# Patient Record
Sex: FEMALE | Race: WHITE
[De-identification: names, ages, dates, MRNs, and addresses within clinical notes are randomized per-mention and may not be internally consistent; named-entity substitution may affect disease eponyms.]

---

## 2020-08-17 ENCOUNTER — RESULT REVIEW (OUTPATIENT)
Age: 70
End: 2020-08-17

## 2021-08-23 ENCOUNTER — RESULT REVIEW (OUTPATIENT)
Age: 71
End: 2021-08-23

## 2021-10-21 PROBLEM — Z00.00 ENCOUNTER FOR PREVENTIVE HEALTH EXAMINATION: Status: ACTIVE | Noted: 2021-10-21

## 2021-12-22 ENCOUNTER — APPOINTMENT (OUTPATIENT)
Dept: UROLOGY | Facility: CLINIC | Age: 71
End: 2021-12-22
Payer: MEDICARE

## 2021-12-22 VITALS
BODY MASS INDEX: 26.87 KG/M2 | TEMPERATURE: 97.5 F | RESPIRATION RATE: 16 BRPM | WEIGHT: 146 LBS | HEIGHT: 62 IN | HEART RATE: 74 BPM | SYSTOLIC BLOOD PRESSURE: 143 MMHG | DIASTOLIC BLOOD PRESSURE: 84 MMHG

## 2021-12-22 DIAGNOSIS — Z86.2 PERSONAL HISTORY OF DISEASES OF THE BLOOD AND BLOOD-FORMING ORGANS AND CERTAIN DISORDERS INVOLVING THE IMMUNE MECHANISM: ICD-10-CM

## 2021-12-22 DIAGNOSIS — Z80.6 FAMILY HISTORY OF LEUKEMIA: ICD-10-CM

## 2021-12-22 DIAGNOSIS — Z78.9 OTHER SPECIFIED HEALTH STATUS: ICD-10-CM

## 2021-12-22 DIAGNOSIS — N20.0 CALCULUS OF KIDNEY: ICD-10-CM

## 2021-12-22 DIAGNOSIS — H04.129 DRY EYE SYNDROME OF UNSPECIFIED LACRIMAL GLAND: ICD-10-CM

## 2021-12-22 PROCEDURE — 99203 OFFICE O/P NEW LOW 30 MIN: CPT

## 2021-12-22 PROCEDURE — 51798 US URINE CAPACITY MEASURE: CPT

## 2021-12-22 RX ORDER — ESTRADIOL 0.1 MG/G
0.1 CREAM VAGINAL
Refills: 0 | Status: ACTIVE | COMMUNITY

## 2021-12-22 RX ORDER — WHEAT DEXTRIN 3 G/4 G
POWDER (GRAM) ORAL
Refills: 0 | Status: ACTIVE | COMMUNITY

## 2021-12-22 RX ORDER — SIMVASTATIN 20 MG/1
20 TABLET, FILM COATED ORAL
Refills: 0 | Status: ACTIVE | COMMUNITY

## 2021-12-22 RX ORDER — CYCLOSPORINE 0.5 MG/ML
0.05 EMULSION OPHTHALMIC
Refills: 0 | Status: ACTIVE | COMMUNITY

## 2021-12-22 RX ORDER — LEVOTHYROXINE SODIUM 150 UG/1
150 TABLET ORAL
Refills: 0 | Status: ACTIVE | COMMUNITY

## 2021-12-22 NOTE — ASSESSMENT
[FreeTextEntry1] : 71 year old nulligravida woman with a year history of urinary  frequency and urgency.She has been on oxybutynin XR 20 mg for the past one year and reports improvement with her urinary bother and has some mild side effects of dry mouth\par \par \par Life style behavioral modifications reviewed - Fluids management - Drink 48 -64  oz of water as long as no medical contraindications, refrain from drinking fluids 3 hours before  bedtime. Address constipation  with fiber intake - fruits and vegetable servings 3-5 per day, use of daily Gummy fibers supplements if needed .\par UA and cx send \par \par Renal US - script [facility]\par  \par Stop Oxybutynin ER 20 MG DAILY.Start trial of Trospium Chloride 20  mg po BID \par \par RTO in 4 months.Pt is planning to leave for [facility] in 2 weeks and back in March END 2022.\par

## 2021-12-22 NOTE — PHYSICAL EXAM
[General Appearance - Well Developed] : well developed [General Appearance - Well Nourished] : well nourished [Normal Appearance] : normal appearance [Well Groomed] : well groomed [General Appearance - In No Acute Distress] : no acute distress [Edema] : no peripheral edema [Respiration, Rhythm And Depth] : normal respiratory rhythm and effort [Exaggerated Use Of Accessory Muscles For Inspiration] : no accessory muscle use [Abdomen Soft] : soft [Abdomen Tenderness] : non-tender [Costovertebral Angle Tenderness] : no ~M costovertebral angle tenderness [Urinary Bladder Findings] : the bladder was normal on palpation [FreeTextEntry1] :  deferred  [Normal Station and Gait] : the gait and station were normal for the patient's age [] : no rash [Oriented To Time, Place, And Person] : oriented to person, place, and time [Affect] : the affect was normal [Mood] : the mood was normal [Not Anxious] : not anxious

## 2021-12-22 NOTE — HISTORY OF PRESENT ILLNESS
[FreeTextEntry1] : 71 year old nulligravida woman with a year history of urinary  frequency and urgency.She has been on oxybutynin XR 20 mg for the past one year and reports improvement with her urinary bother and has some mild side effects of dry mouth. she has been using Estrace  vaginal cream 1 or 2 times per week and feels it has also helped .fluids:  she drinks 1 1/2 litre  of water sometimes seltzer  and One Cup of coffee. Denies Constipation. Voids every two to three hours during the day and night time may wake up X 1 if she drank close to bedtime. She does report an occasional weak stream ,incomplete bladder emptying and mild mixed unary incontinence urge predominant requiring one liner per day.\par PVR - 146  ml\par UA and cx send \par Renal US - [facility] \par Stop Oxybutynin ER 20 MG DAILY.Start trial of Trospium Chloride 20  mg po BID \par RTO in 4 months.Pt is planning to leave for [facility] in 2 weeks and back in March END 2022.\par

## 2021-12-27 LAB
APPEARANCE: CLEAR
BACTERIA UR CULT: NORMAL
BACTERIA: NEGATIVE
BILIRUBIN URINE: NEGATIVE
BLOOD URINE: NEGATIVE
COLOR: NORMAL
GLUCOSE QUALITATIVE U: NEGATIVE
HYALINE CASTS: 0 /LPF
KETONES URINE: NEGATIVE
LEUKOCYTE ESTERASE URINE: NEGATIVE
MICROSCOPIC-UA: NORMAL
NITRITE URINE: NEGATIVE
PH URINE: 7
PROTEIN URINE: NEGATIVE
RED BLOOD CELLS URINE: 0 /HPF
SPECIFIC GRAVITY URINE: 1.01
SQUAMOUS EPITHELIAL CELLS: 0 /HPF
UROBILINOGEN URINE: NORMAL
WHITE BLOOD CELLS URINE: 0 /HPF

## 2022-01-04 ENCOUNTER — NON-APPOINTMENT (OUTPATIENT)
Age: 72
End: 2022-01-04

## 2022-01-11 ENCOUNTER — NON-APPOINTMENT (OUTPATIENT)
Age: 72
End: 2022-01-11

## 2022-04-11 ENCOUNTER — APPOINTMENT (OUTPATIENT)
Dept: UROLOGY | Facility: CLINIC | Age: 72
End: 2022-04-11
Payer: MEDICARE

## 2022-04-11 VITALS — DIASTOLIC BLOOD PRESSURE: 83 MMHG | HEART RATE: 68 BPM | SYSTOLIC BLOOD PRESSURE: 127 MMHG

## 2022-04-11 PROCEDURE — 99213 OFFICE O/P EST LOW 20 MIN: CPT

## 2022-04-11 RX ORDER — ERYTHROMYCIN 5 MG/G
5 OINTMENT OPHTHALMIC
Qty: 35 | Refills: 0 | Status: COMPLETED | COMMUNITY
Start: 2021-11-18

## 2022-04-11 RX ORDER — AZITHROMYCIN 250 MG/1
250 TABLET, FILM COATED ORAL
Qty: 6 | Refills: 0 | Status: COMPLETED | COMMUNITY
Start: 2022-01-13

## 2022-04-11 RX ORDER — TROSPIUM CHLORIDE 20 MG/1
20 TABLET, FILM COATED ORAL
Qty: 180 | Refills: 3 | Status: DISCONTINUED | COMMUNITY
Start: 2021-12-22 | End: 2022-04-11

## 2022-04-11 NOTE — PHYSICAL EXAM
[Normal Appearance] : normal appearance [Well Groomed] : well groomed [Abdomen Soft] : soft [Abdomen Tenderness] : non-tender [Costovertebral Angle Tenderness] : no ~M costovertebral angle tenderness [Urinary Bladder Findings] : the bladder was normal on palpation [FreeTextEntry1] :  deferred  [Respiration, Rhythm And Depth] : normal respiratory rhythm and effort [Exaggerated Use Of Accessory Muscles For Inspiration] : no accessory muscle use [Affect] : the affect was normal [Mood] : the mood was normal [Not Anxious] : not anxious [Normal Station and Gait] : the gait and station were normal for the patient's age [General Appearance - Well Developed] : well developed [General Appearance - Well Nourished] : well nourished [General Appearance - In No Acute Distress] : no acute distress [Edema] : no peripheral edema [] : no respiratory distress [Oriented To Time, Place, And Person] : oriented to person, place, and time

## 2022-04-11 NOTE — PHYSICAL EXAM
[Normal Appearance] : normal appearance [Well Groomed] : well groomed [Abdomen Tenderness] : non-tender [Abdomen Soft] : soft [Costovertebral Angle Tenderness] : no ~M costovertebral angle tenderness [Urinary Bladder Findings] : the bladder was normal on palpation [FreeTextEntry1] :  deferred  [Respiration, Rhythm And Depth] : normal respiratory rhythm and effort [Exaggerated Use Of Accessory Muscles For Inspiration] : no accessory muscle use [Mood] : the mood was normal [Affect] : the affect was normal [Not Anxious] : not anxious [Normal Station and Gait] : the gait and station were normal for the patient's age [General Appearance - Well Developed] : well developed [General Appearance - Well Nourished] : well nourished [General Appearance - In No Acute Distress] : no acute distress [Edema] : no peripheral edema [] : no respiratory distress [Oriented To Time, Place, And Person] : oriented to person, place, and time

## 2022-04-12 ENCOUNTER — TRANSCRIPTION ENCOUNTER (OUTPATIENT)
Age: 72
End: 2022-04-12

## 2022-04-20 NOTE — HISTORY OF PRESENT ILLNESS
[FreeTextEntry1] : 71 year old woman with a year history of urinary  frequency and urgency. She has been on oxybutynin XR 20 mg for the past one year and reports improvement with her urinary bother and has some mild side effects of dry mouth. She was given trospium from us and felt that this had not helped. She restarted oxybutynin and reports is is helping with her urinary sx much more than previously. She has been using Estrace vaginal cream 1 or 2 times per week. \par \par She drinks 1 1/2 litre  of water sometimes seltzer  and One Cup of coffee. Denies Constipation. Voids every two to three hours during the day and night time may wake up X 1 if she drank close to bedtime. She does report an occasional weak stream ,incomplete bladder emptying and mild mixed unary incontinence urge predominant requiring one liner per day.\par \par At last visit, PVR - 146  ml. Urinlaysis was normal. Today, PVR=0\par \par She also has known hx of AML. 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 \par \par

## 2022-04-20 NOTE — ASSESSMENT
[FreeTextEntry1] : Bothersome urinary sx and incontinence. Not controlled with oxybutynin alone on highest dose. Failed (no benefit) with trospium. \par --COnt oxybutynin 20mg\par --Begin myrbetriq\par

## 2022-04-25 ENCOUNTER — TRANSCRIPTION ENCOUNTER (OUTPATIENT)
Age: 72
End: 2022-04-25

## 2022-04-26 ENCOUNTER — TRANSCRIPTION ENCOUNTER (OUTPATIENT)
Age: 72
End: 2022-04-26

## 2022-05-04 ENCOUNTER — TRANSCRIPTION ENCOUNTER (OUTPATIENT)
Age: 72
End: 2022-05-04

## 2022-08-30 ENCOUNTER — NON-APPOINTMENT (OUTPATIENT)
Age: 72
End: 2022-08-30

## 2022-09-05 ENCOUNTER — RESULT REVIEW (OUTPATIENT)
Age: 72
End: 2022-09-05

## 2022-09-06 ENCOUNTER — APPOINTMENT (OUTPATIENT)
Dept: OBGYN | Facility: CLINIC | Age: 72
End: 2022-09-06

## 2022-09-06 PROCEDURE — 76856 US EXAM PELVIC COMPLETE: CPT

## 2022-09-06 PROCEDURE — 81002 URINALYSIS NONAUTO W/O SCOPE: CPT

## 2022-09-06 PROCEDURE — 99213 OFFICE O/P EST LOW 20 MIN: CPT | Mod: 25

## 2022-09-06 PROCEDURE — 82270 OCCULT BLOOD FECES: CPT

## 2022-09-06 PROCEDURE — G0101: CPT

## 2022-11-08 NOTE — PHYSICAL EXAM
[General Appearance - Well Developed] : well developed [General Appearance - Well Nourished] : well nourished [General Appearance - In No Acute Distress] : no acute distress [Edema] : no peripheral edema [] : no respiratory distress [Oriented To Time, Place, And Person] : oriented to person, place, and time

## 2022-11-09 ENCOUNTER — APPOINTMENT (OUTPATIENT)
Dept: UROLOGY | Facility: CLINIC | Age: 72
End: 2022-11-09

## 2022-11-09 VITALS
DIASTOLIC BLOOD PRESSURE: 76 MMHG | TEMPERATURE: 97.5 F | RESPIRATION RATE: 16 BRPM | HEART RATE: 65 BPM | OXYGEN SATURATION: 99 % | SYSTOLIC BLOOD PRESSURE: 146 MMHG

## 2022-11-09 PROCEDURE — 99214 OFFICE O/P EST MOD 30 MIN: CPT

## 2022-11-09 NOTE — ASSESSMENT
[FreeTextEntry1] : \par Recurrent UTIs. \par --UA, UCx\par --Begin estrace at 3x per week\par --Cont increased fluid intake\par --Cipro to bring to [place] for travel\par \par Bothersome urinary sx and incontinence. Not controlled with oxybutynin alone on highest dose. Failed (no benefit) with trospium. Great response with oxybutynin and myrbetriq. \par --COnt oxybutynin 20mg\par --Cont myrbetriq\par

## 2022-11-09 NOTE — HISTORY OF PRESENT ILLNESS
[FreeTextEntry1] : 73yo female with cc of OAB and recurrent UTI. Pt was seen for frequency and urgency. Voids every two to three hours during the day and night time may wake up X 1 if she drank close to bedtime. She does report an occasional weak stream ,incomplete bladder emptying and mild mixed unary incontinence urge predominant. She has been on oxybutynin XR 20 mg which improved her symptoms and has some mild side effects of dry mouth. She was given trospium from us and felt that this had not helped. She restarted oxybutynin and reports is is helping with her urinary sx much more than previously. She has been using Estrace vaginal cream 1 or 2 times per week. At last visit was placed on myrbetriq. SHe has been taking both. SHe is no longer leaking but still wears a pad for safety. \par \par She reports 4 infections in the last several months. First was in June. Tx with doxy from PCP. Felt better with this but then sx returned 1mo later. SHe called a friend and took doxy again. Less than a month later again with sx. She went to urgent care (8/31). SHe was placed on macrobid (unable to see cx results). Went back to urgent care in Oct. Took macrobid again that she had on her own. Able to review UCx from PCP that showed EColi pansensitive. SHe has only ever had 1 UTI in her lifetime. \par \par She drinks water well. Drinks 1.5-2L per day. Has issues with constipation. Takes benefiber daily. On estrace but takes 1x per week at best\par \par Has sulfa and keflex allergies. No PCN allergy. Has not had other cephalosporins\par \par She also has known hx of AML. 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 \par \par

## 2022-11-11 LAB
APPEARANCE: CLEAR
BACTERIA UR CULT: NORMAL
BACTERIA: NEGATIVE
BILIRUBIN URINE: NEGATIVE
BLOOD URINE: NEGATIVE
COLOR: YELLOW
GLUCOSE QUALITATIVE U: NEGATIVE
HYALINE CASTS: 0 /LPF
KETONES URINE: NEGATIVE
LEUKOCYTE ESTERASE URINE: NEGATIVE
MICROSCOPIC-UA: NORMAL
NITRITE URINE: NEGATIVE
PH URINE: 7.5
PROTEIN URINE: NEGATIVE
RED BLOOD CELLS URINE: 1 /HPF
SPECIFIC GRAVITY URINE: 1.01
SQUAMOUS EPITHELIAL CELLS: 0 /HPF
UROBILINOGEN URINE: NORMAL
WHITE BLOOD CELLS URINE: 0 /HPF

## 2022-11-15 ENCOUNTER — TRANSCRIPTION ENCOUNTER (OUTPATIENT)
Age: 72
End: 2022-11-15

## 2022-11-15 ENCOUNTER — NON-APPOINTMENT (OUTPATIENT)
Age: 72
End: 2022-11-15

## 2023-03-20 ENCOUNTER — APPOINTMENT (OUTPATIENT)
Dept: UROLOGY | Facility: CLINIC | Age: 73
End: 2023-03-20
Payer: MEDICARE

## 2023-03-20 DIAGNOSIS — R35.0 FREQUENCY OF MICTURITION: ICD-10-CM

## 2023-03-20 DIAGNOSIS — N39.0 URINARY TRACT INFECTION, SITE NOT SPECIFIED: ICD-10-CM

## 2023-03-20 PROCEDURE — 99214 OFFICE O/P EST MOD 30 MIN: CPT

## 2023-03-20 RX ORDER — AMOXICILLIN AND CLAVULANATE POTASSIUM 875; 125 MG/1; MG/1
875-125 TABLET, COATED ORAL
Qty: 14 | Refills: 2 | Status: ACTIVE | COMMUNITY
Start: 2023-03-20 | End: 1900-01-01

## 2023-03-20 RX ORDER — CIPROFLOXACIN HYDROCHLORIDE 500 MG/1
500 TABLET, FILM COATED ORAL TWICE DAILY
Qty: 14 | Refills: 0 | Status: COMPLETED | COMMUNITY
Start: 2022-11-09 | End: 2023-03-20

## 2023-03-21 ENCOUNTER — TRANSCRIPTION ENCOUNTER (OUTPATIENT)
Age: 73
End: 2023-03-21

## 2023-03-21 PROBLEM — R35.0 INCREASED URINARY FREQUENCY: Status: ACTIVE | Noted: 2021-12-22

## 2023-03-21 LAB
APPEARANCE: ABNORMAL
BACTERIA: ABNORMAL
BILIRUBIN URINE: NEGATIVE
BLOOD URINE: ABNORMAL
COLOR: NORMAL
GLUCOSE QUALITATIVE U: NEGATIVE
HYALINE CASTS: 0 /LPF
KETONES URINE: NEGATIVE
LEUKOCYTE ESTERASE URINE: ABNORMAL
MICROSCOPIC-UA: NORMAL
NITRITE URINE: NEGATIVE
PH URINE: 7.5
PROTEIN URINE: NEGATIVE
RED BLOOD CELLS URINE: 32 /HPF
SPECIFIC GRAVITY URINE: 1
SQUAMOUS EPITHELIAL CELLS: 0 /HPF
UROBILINOGEN URINE: NORMAL
WHITE BLOOD CELLS URINE: 171 /HPF

## 2023-03-21 NOTE — HISTORY OF PRESENT ILLNESS
[FreeTextEntry1] : 73yo female with cc of OAB and recurrent UTI. Pt was seen for frequency and urgency. Voids every two to three hours during the day and night time may wake up X 1 if she drank close to bedtime. She does report an occasional weak stream ,incomplete bladder emptying and mild mixed unary incontinence urge predominant. She has been on oxybutynin XR 20 mg which improved her symptoms and has some mild side effects of dry mouth. She was given trospium from us and felt that this had not helped. She restarted oxybutynin and reports is is helping with her urinary sx much more than previously. Placed on myrbetriq as well. SHe has been taking both. SHe is no longer leaking but still wears a pad for safety. \par \par Also has issues with infection. Took doxy on her own on a couple of occasions.  Able to review UCx from PCP that showed EColi pansensitive. SHe was placed on estrace but takes 1x per week at best. She reports that she has gotten better with this. She drinks water well. Drinks 1.5-2L per day. Has issues with constipation. Takes benefiber daily. Has sulfa and keflex allergies. No PCN allergy. Has taken other cephalosporins without issue. \par \par She also has known hx of AML. 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 \par \par She had used cipro I gave her while she was in [place]. She reports she had sx on 3/7. She called a doctor and got cipro and pyridium. Cipro was only 5d. She felt better. Yesterday reports that she had dysuria. Had again this am and was more uncomfortable. Was having a spasm feeling of the urethra. She dropped urine results at lab this am. Reports urine has been cloudy. No fever or chills. Has been off abx for a week.

## 2023-03-21 NOTE — ASSESSMENT
[FreeTextEntry1] : Recurrent UTIs. \par --UA, UCx\par --Cont estrace at 3x per week\par --Cont increased fluid intake\par --Azo x1 week\par --Empiric tx with augmentin. pt will try to  hold off until cx is back\par \par \par Bothersome urinary sx and incontinence. Not controlled with oxybutynin alone on highest dose. Failed (no benefit) with trospium. Great response with oxybutynin and myrbetriq. \par --COnt oxybutynin 20mg\par --Cont myrbetriq\par

## 2023-03-22 ENCOUNTER — NON-APPOINTMENT (OUTPATIENT)
Age: 73
End: 2023-03-22

## 2023-03-22 LAB — BACTERIA UR CULT: NORMAL

## 2023-03-23 ENCOUNTER — NON-APPOINTMENT (OUTPATIENT)
Age: 73
End: 2023-03-23

## 2023-09-12 ENCOUNTER — APPOINTMENT (OUTPATIENT)
Dept: OBGYN | Facility: CLINIC | Age: 73
End: 2023-09-12
Payer: MEDICARE

## 2023-09-12 PROCEDURE — 99213 OFFICE O/P EST LOW 20 MIN: CPT | Mod: 25

## 2023-11-16 ENCOUNTER — TRANSCRIPTION ENCOUNTER (OUTPATIENT)
Age: 73
End: 2023-11-16

## 2023-11-16 ENCOUNTER — RX RENEWAL (OUTPATIENT)
Age: 73
End: 2023-11-16

## 2024-03-06 ENCOUNTER — APPOINTMENT (OUTPATIENT)
Dept: UROLOGY | Facility: CLINIC | Age: 74
End: 2024-03-06
Payer: MEDICARE

## 2024-03-06 DIAGNOSIS — R39.15 URGENCY OF URINATION: ICD-10-CM

## 2024-03-06 DIAGNOSIS — R30.0 DYSURIA: ICD-10-CM

## 2024-03-06 PROCEDURE — G2211 COMPLEX E/M VISIT ADD ON: CPT

## 2024-03-06 PROCEDURE — 99213 OFFICE O/P EST LOW 20 MIN: CPT

## 2024-03-06 RX ORDER — OXYBUTYNIN CHLORIDE 10 MG/1
10 TABLET, EXTENDED RELEASE ORAL
Qty: 180 | Refills: 0 | Status: DISCONTINUED | COMMUNITY
End: 2024-03-06

## 2024-03-06 RX ORDER — PHENAZOPYRIDINE HYDROCHLORIDE 200 MG/1
200 TABLET ORAL 3 TIMES DAILY
Qty: 21 | Refills: 3 | Status: DISCONTINUED | COMMUNITY
Start: 2023-03-20 | End: 2024-03-06

## 2024-03-06 RX ORDER — MIRABEGRON 50 MG/1
50 TABLET, FILM COATED, EXTENDED RELEASE ORAL
Qty: 90 | Refills: 3 | Status: ACTIVE | COMMUNITY
Start: 2022-04-11 | End: 1900-01-01

## 2024-03-06 RX ORDER — OXYBUTYNIN CHLORIDE 10 MG/1
10 TABLET, EXTENDED RELEASE ORAL
Qty: 180 | Refills: 3 | Status: ACTIVE | COMMUNITY
Start: 2022-04-25 | End: 1900-01-01

## 2024-03-06 RX ORDER — ESTRADIOL 0.1 MG/G
0.1 CREAM VAGINAL
Qty: 42.5 | Refills: 11 | Status: ACTIVE | COMMUNITY
Start: 2022-11-09 | End: 1900-01-01

## 2024-03-06 NOTE — ASSESSMENT
[FreeTextEntry1] :  OAB. pt continues to report relief w/ oxybutynin and myrbetriq. Takes Pyridium for bladder pain.  - c/w oxybutynin and myrbetriq. - cont bowel regimen  UTI/vaginal dryness/GUSMPt w/o infection since last visit.  -estrace -barrier cream externally

## 2024-03-06 NOTE — HISTORY OF PRESENT ILLNESS
[None] : no symptoms [FreeTextEntry1] : 72yo female with cc of OAB and recurrent UTI. Pt was seen for frequency and urgency. Voids every two to three hours during the day and night time may wake up X 1 if she drank close to bedtime. She does report an occasional weak stream ,incomplete bladder emptying and mild mixed unary incontinence urge predominant. She has been on oxybutynin XR 20 mg which improved her symptoms and has some mild side effects of dry mouth. She was given trospium from us and felt that this had not helped. She restarted oxybutynin and reports is is helping with her urinary sx much more than previously. Placed on myrbetriq as well. SHe has been taking both. SHe is no longer leaking but still wears a pad for safety.  Also has issues with infection. Took doxy on her own on a couple of occasions. Able to review UCx from PCP that showed EColi pansensitive. SHe was placed on estrace but takes 1x per week at best. She reports that she has gotten better with this. She drinks water well. Drinks 1.5-2L per day. Has issues with constipation. Takes benefiber daily. Has sulfa and keflex allergies. No PCN allergy. Has taken other cephalosporins without issue.  She also has known hx of AML. 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  She had used cipro I gave her while she was in [facility]. She reports she had sx on 3/7. She called a doctor and got cipro and pyridium. Cipro was only 5d. She felt better. Yesterday reports that she had dysuria. Had again this am and was more uncomfortable. Was having a spasm feeling of the urethra. She dropped urine results at lab this am. Reports urine has been cloudy. No fever or chills. Has been off abx for a week.  Pt presents today for follow up. Overall, pt reports symptomatic relief from oxybutynin and myrbetriq. She still has occasionaly urgency, but is able to delay urination w/o incontinence. Pt however, reports constipation, and sees a Gastroenterologist, on senna, and prn miralax. Pt has not had UTI's since last visit, not on any abx but takes Pyridium for bladder pain. Continues to use esterase and topical vaginal cream for vaginal dryness.

## 2024-03-07 LAB
APPEARANCE: CLEAR
BILIRUBIN URINE: NEGATIVE
BLOOD URINE: NEGATIVE
COLOR: YELLOW
GLUCOSE QUALITATIVE U: NEGATIVE MG/DL
KETONES URINE: NEGATIVE MG/DL
LEUKOCYTE ESTERASE URINE: NEGATIVE
NITRITE URINE: NEGATIVE
PH URINE: 6.5
PROTEIN URINE: NEGATIVE MG/DL
SPECIFIC GRAVITY URINE: 1.01
UROBILINOGEN URINE: 0.2 MG/DL

## 2024-10-08 ENCOUNTER — APPOINTMENT (OUTPATIENT)
Dept: OBGYN | Facility: CLINIC | Age: 74
End: 2024-10-08

## 2024-10-08 PROCEDURE — 99459 PELVIC EXAMINATION: CPT

## 2024-10-08 PROCEDURE — 81002 URINALYSIS NONAUTO W/O SCOPE: CPT

## 2024-10-08 PROCEDURE — 99397 PER PM REEVAL EST PAT 65+ YR: CPT | Mod: GY

## 2024-12-11 ENCOUNTER — NON-APPOINTMENT (OUTPATIENT)
Age: 74
End: 2024-12-11

## 2024-12-16 ENCOUNTER — APPOINTMENT (OUTPATIENT)
Dept: UROLOGY | Facility: CLINIC | Age: 74
End: 2024-12-16
Payer: MEDICARE

## 2024-12-16 VITALS — OXYGEN SATURATION: 97 % | HEART RATE: 61 BPM | SYSTOLIC BLOOD PRESSURE: 139 MMHG | DIASTOLIC BLOOD PRESSURE: 74 MMHG

## 2024-12-16 DIAGNOSIS — R35.0 FREQUENCY OF MICTURITION: ICD-10-CM

## 2024-12-16 DIAGNOSIS — R30.0 DYSURIA: ICD-10-CM

## 2024-12-16 PROCEDURE — 99214 OFFICE O/P EST MOD 30 MIN: CPT

## 2024-12-16 PROCEDURE — G2211 COMPLEX E/M VISIT ADD ON: CPT

## 2024-12-17 LAB
APPEARANCE: CLEAR
BACTERIA: NEGATIVE /HPF
BILIRUBIN URINE: NEGATIVE
BLOOD URINE: NEGATIVE
CAST: 0 /LPF
COLOR: YELLOW
EPITHELIAL CELLS: 0 /HPF
GLUCOSE QUALITATIVE U: NEGATIVE MG/DL
KETONES URINE: NEGATIVE MG/DL
LEUKOCYTE ESTERASE URINE: NEGATIVE
MICROSCOPIC-UA: NORMAL
NITRITE URINE: NEGATIVE
PH URINE: 7
PROTEIN URINE: NEGATIVE MG/DL
RED BLOOD CELLS URINE: 0 /HPF
SPECIFIC GRAVITY URINE: 1
UROBILINOGEN URINE: 0.2 MG/DL
WHITE BLOOD CELLS URINE: 0 /HPF

## 2024-12-19 LAB — BACTERIA UR CULT: NORMAL

## 2025-05-06 ENCOUNTER — TRANSCRIPTION ENCOUNTER (OUTPATIENT)
Age: 75
End: 2025-05-06

## 2025-05-07 ENCOUNTER — TRANSCRIPTION ENCOUNTER (OUTPATIENT)
Age: 75
End: 2025-05-07

## 2025-05-12 ENCOUNTER — TRANSCRIPTION ENCOUNTER (OUTPATIENT)
Age: 75
End: 2025-05-12

## 2025-05-15 ENCOUNTER — TRANSCRIPTION ENCOUNTER (OUTPATIENT)
Age: 75
End: 2025-05-15

## 2025-05-16 ENCOUNTER — TRANSCRIPTION ENCOUNTER (OUTPATIENT)
Age: 75
End: 2025-05-16

## 2025-09-04 ENCOUNTER — RX RENEWAL (OUTPATIENT)
Age: 75
End: 2025-09-04